# Patient Record
Sex: MALE | Race: ASIAN
[De-identification: names, ages, dates, MRNs, and addresses within clinical notes are randomized per-mention and may not be internally consistent; named-entity substitution may affect disease eponyms.]

---

## 2023-01-29 ENCOUNTER — HOSPITAL ENCOUNTER (EMERGENCY)
Dept: HOSPITAL 4 - SED | Age: 33
Discharge: HOME | End: 2023-01-29
Payer: MEDICAID

## 2023-01-29 VITALS — SYSTOLIC BLOOD PRESSURE: 128 MMHG

## 2023-01-29 VITALS — HEIGHT: 63 IN | BODY MASS INDEX: 0.18 KG/M2 | WEIGHT: 1 LBS

## 2023-01-29 VITALS — SYSTOLIC BLOOD PRESSURE: 159 MMHG

## 2023-01-29 DIAGNOSIS — S61.215A: Primary | ICD-10-CM
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
## 2023-01-29 DIAGNOSIS — Z79.899: ICD-10-CM
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
## 2023-01-29 NOTE — NUR
Wound cleansed with NS and betadine. Wound covered with sterile gauge dsg and 
Kerlex. Cap refil < 3 sec to nail bed.

## 2023-01-29 NOTE — NUR
Pt placed to ER chair 2 with c/o ~1.5 lac to tip of left finger. Pt states that 
he accidently cut his finger with a knife. No active bleeding noted.